# Patient Record
Sex: FEMALE | Race: WHITE | Employment: STUDENT | URBAN - METROPOLITAN AREA
[De-identification: names, ages, dates, MRNs, and addresses within clinical notes are randomized per-mention and may not be internally consistent; named-entity substitution may affect disease eponyms.]

---
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
## 2022-09-27 NOTE — PROGRESS NOTES
05/23/19 1500   Coping Skills - Pt/family understands and demonstrates how to adaptively compensate for illness related barriers   Interventions Verbal instruction   Identified Learner Patient   Readiness to Learn Asks questions;Cooperative   Response to Teaching verbalizes understanding;demonstrates behavior change   Treatment Focus symptom management;personal safety;abstinence/relapse prevention   Comments Sober Fun Activities   5/23/2019 Dimension 3, 5 and 6  Group Chart Note - Number of clients attending the group:  5      Elva Vasquez attended 1 hour Psychoeducation group covering the following topics Relapse Prevention.  Client was Actively participating.  Client's response:  Engaged.    [facility]  Cardiac Electrophysiology  [address]  [place] [address]   Office: [phone]  Fax: [phone]       Thank you for seeing us in clinic today - it is a pleasure to be a part of your care team.  Below is a summary of our plan from today's visit.      You were noted on your 8/17/2022 Zio monitor to have had an episode of nonsustained ventricular tachycardia.  Your other evaluation has revealed a structurally normal heart without evidence of reduced function, chamber dilation or scarring.  Overall, these findings in aggregate suggest a low risk of significant arrhythmia or structural heart problems.  We will plan for the following:  - stop metoprolol XL 25mg daily  - plan for 30 day cardiac rhythm monitoring  - ongoing evaluation with your primary care provider   - if symptoms persist and no other cause for your symptoms is identified, referral to [facility] for consideration for evaluation of dysautonomia may be reasonable    Please do not hesitate to be in touch with our office at [phone] with any questions that may arise.      Thank you for trusting us with your care,    Maria Del Carmen Baum MD  Clinical Cardiac Electrophysiology  [facility]  [address]  [place] [address]   Office: [phone]  Fax: [phone]

## 2023-01-31 NOTE — TELEPHONE ENCOUNTER
"  [facility]           Pharmacy  Admission Medication History     The home medication history was taken by Janeen Cervantes.      Medication history obtained from Medications listed below were obtained from: Patient/family    Based on information gathered for medication list, you may go to "Admission" then "Reconcile Home Medications" tabs to review and/or act upon those items.     The home medication list has been updated by the Pharmacy department.   Please read ALL comments highlighted in yellow.   Please address this information as you see fit.    Feel free to contact us if you have any questions or require assistance.    The medications listed below were removed from the home medication list.  Please reorder if appropriate:    Patient reports NOT TAKING the following medication(s):  Omnicef 300mg        No current facility-administered medications on file prior to encounter.     Current Outpatient Medications on File Prior to Encounter   Medication Sig Dispense Refill    ALPRAZolam (XANAX) 0.25 MG tablet Take 0.25 mg by mouth 2 (two) times daily.      busPIRone (BUSPAR) 10 MG tablet Take 10 mg by mouth 2 (two) times daily.      dextroamphetamine-amphetamine (ADDERALL) 20 mg tablet Take 20 mg by mouth 2 (two) times daily.  0    FLUoxetine 40 MG capsule Take 80 mg by mouth every morning.  0    LATUDA 40 mg Tab tablet Take 40 mg by mouth once daily.      OLANZapine (ZYPREXA) 5 MG tablet Take 5 mg by mouth every evening.      venlafaxine (EFFEXOR-XR) 75 MG 24 hr capsule Take 75 mg by mouth every morning.      fluticasone propionate (FLONASE) 50 mcg/actuation nasal spray 2 sprays by Each Nostril route.      lamoTRIgine (LAMICTAL) 200 MG tablet Take 200 mg by mouth every evening.  0    senna-docusate 8.6-50 mg (SENNA WITH DOCUSATE SODIUM) 8.6-50 mg per tablet Take 2 tablets by mouth once daily. 180 tablet 3       Please address this information as you see fit.  Feel free to contact us if you have " Patient's mother, Shima Vasquez, picked up envelope, verified ID, book signed    Say Perez, Patient Representative   any questions or require assistance.    Janeen Cervantes  [phone]                .

## 2025-04-27 NOTE — PROGRESS NOTES
5/17/2019 Dimension 3  Group Chart Note - Co-facilitated with Saige Low [facility].  Number of clients attending the group:  7      Elva Vasquez attended 0.5 hour DBT group covering the following topics Interpersonal Effectiveness.  Client was Actively participating, Engaged and Distracted.  Client's response:  Client engaged in DBT group focusing on interpersonal skills, specifically healthy communication. Client was instructed to participate in 'Story Book Communication' and guided drawing activities, which she did. Client was observed to interrupt her peers consistently. She was excused half way through group to meet with her primary counselor.     Batsheva Foley, SANDIP/Psychotherapist Intern     The patient is a 16y Female complaining of abdominal pain.